# Patient Record
Sex: FEMALE | Race: WHITE | NOT HISPANIC OR LATINO
[De-identification: names, ages, dates, MRNs, and addresses within clinical notes are randomized per-mention and may not be internally consistent; named-entity substitution may affect disease eponyms.]

---

## 2017-01-17 ENCOUNTER — PRIOR ORIGINAL RECORDS (OUTPATIENT)
Dept: OTHER | Age: 42
End: 2017-01-17

## 2017-04-05 RX ORDER — FAMOTIDINE 40 MG/1
TABLET, FILM COATED ORAL
Qty: 30 TABLET | Refills: 0 | OUTPATIENT
Start: 2017-04-05

## 2017-07-25 ENCOUNTER — PRIOR ORIGINAL RECORDS (OUTPATIENT)
Dept: OTHER | Age: 42
End: 2017-07-25

## 2017-08-08 ENCOUNTER — PRIOR ORIGINAL RECORDS (OUTPATIENT)
Dept: OTHER | Age: 42
End: 2017-08-08

## 2017-09-25 ENCOUNTER — APPOINTMENT (OUTPATIENT)
Dept: CV DIAGNOSTICS | Facility: HOSPITAL | Age: 42
DRG: 149 | End: 2017-09-25
Attending: INTERNAL MEDICINE
Payer: COMMERCIAL

## 2017-09-25 ENCOUNTER — APPOINTMENT (OUTPATIENT)
Dept: GENERAL RADIOLOGY | Facility: HOSPITAL | Age: 42
DRG: 149 | End: 2017-09-25
Attending: INTERNAL MEDICINE
Payer: COMMERCIAL

## 2017-09-25 ENCOUNTER — APPOINTMENT (OUTPATIENT)
Dept: MRI IMAGING | Facility: HOSPITAL | Age: 42
DRG: 149 | End: 2017-09-25
Attending: NURSE PRACTITIONER
Payer: COMMERCIAL

## 2017-09-25 ENCOUNTER — HOSPITAL ENCOUNTER (INPATIENT)
Facility: HOSPITAL | Age: 42
LOS: 1 days | Discharge: HOME | DRG: 149 | End: 2017-09-26
Attending: INTERNAL MEDICINE | Admitting: INTERNAL MEDICINE
Payer: COMMERCIAL

## 2017-09-25 ENCOUNTER — APPOINTMENT (OUTPATIENT)
Dept: ULTRASOUND IMAGING | Facility: HOSPITAL | Age: 42
DRG: 149 | End: 2017-09-25
Attending: INTERNAL MEDICINE
Payer: COMMERCIAL

## 2017-09-25 ENCOUNTER — PRIOR ORIGINAL RECORDS (OUTPATIENT)
Dept: OTHER | Age: 42
End: 2017-09-25

## 2017-09-25 PROBLEM — R42 DIZZINESS: Status: ACTIVE | Noted: 2017-09-25

## 2017-09-25 LAB
ALBUMIN SERPL-MCNC: 2.2 G/DL (ref 3.5–4.8)
ALBUMIN SERPL-MCNC: 4.1 G/DL (ref 3.5–4.8)
ALP LIVER SERPL-CCNC: 35 U/L (ref 37–98)
ALP LIVER SERPL-CCNC: 68 U/L (ref 37–98)
ALT SERPL-CCNC: 22 U/L (ref 14–54)
ALT SERPL-CCNC: 43 U/L (ref 14–54)
APTT PPP: 26.9 SECONDS (ref 25–34)
AST SERPL-CCNC: 16 U/L (ref 15–41)
AST SERPL-CCNC: 24 U/L (ref 15–41)
BASOPHILS # BLD AUTO: 0.04 X10(3) UL (ref 0–0.1)
BASOPHILS # BLD AUTO: 0.04 X10(3) UL (ref 0–0.1)
BASOPHILS NFR BLD AUTO: 0.5 %
BASOPHILS NFR BLD AUTO: 0.5 %
BILIRUB SERPL-MCNC: 0.3 MG/DL (ref 0.1–2)
BILIRUB SERPL-MCNC: 0.4 MG/DL (ref 0.1–2)
BUN BLD-MCNC: 14 MG/DL (ref 8–20)
BUN BLD-MCNC: 8 MG/DL (ref 8–20)
CALCIUM BLD-MCNC: 5.4 MG/DL (ref 8.3–10.3)
CALCIUM BLD-MCNC: 9.2 MG/DL (ref 8.3–10.3)
CHLORIDE: 107 MMOL/L (ref 101–111)
CHLORIDE: 122 MMOL/L (ref 101–111)
CO2: 12 MMOL/L (ref 22–32)
CO2: 21 MMOL/L (ref 22–32)
CREAT BLD-MCNC: 0.31 MG/DL (ref 0.55–1.02)
CREAT BLD-MCNC: 0.91 MG/DL (ref 0.55–1.02)
EOSINOPHIL # BLD AUTO: 0.08 X10(3) UL (ref 0–0.3)
EOSINOPHIL # BLD AUTO: 0.09 X10(3) UL (ref 0–0.3)
EOSINOPHIL NFR BLD AUTO: 1 %
EOSINOPHIL NFR BLD AUTO: 1 %
ERYTHROCYTE [DISTWIDTH] IN BLOOD BY AUTOMATED COUNT: 12.6 % (ref 11.5–16)
ERYTHROCYTE [DISTWIDTH] IN BLOOD BY AUTOMATED COUNT: 12.6 % (ref 11.5–16)
FREE T4: 0.8 NG/DL (ref 0.9–1.8)
GLUCOSE BLD-MCNC: 108 MG/DL (ref 65–99)
GLUCOSE BLD-MCNC: 55 MG/DL (ref 70–99)
GLUCOSE BLD-MCNC: 87 MG/DL (ref 70–99)
HAV IGM SER QL: 1.4 MG/DL (ref 1.7–3)
HAV IGM SER QL: 2.2 MG/DL (ref 1.7–3)
HCT VFR BLD AUTO: 40.3 % (ref 34–50)
HCT VFR BLD AUTO: 42.8 % (ref 34–50)
HGB BLD-MCNC: 14.2 G/DL (ref 12–16)
HGB BLD-MCNC: 14.4 G/DL (ref 12–16)
IMMATURE GRANULOCYTE COUNT: 0.04 X10(3) UL (ref 0–1)
IMMATURE GRANULOCYTE COUNT: 0.04 X10(3) UL (ref 0–1)
IMMATURE GRANULOCYTE RATIO %: 0.5 %
IMMATURE GRANULOCYTE RATIO %: 0.5 %
INR BLD: 1.08 (ref 0.89–1.11)
LYMPHOCYTES # BLD AUTO: 1.52 X10(3) UL (ref 0.9–4)
LYMPHOCYTES # BLD AUTO: 1.63 X10(3) UL (ref 0.9–4)
LYMPHOCYTES NFR BLD AUTO: 18.3 %
LYMPHOCYTES NFR BLD AUTO: 18.7 %
M PROTEIN MFR SERPL ELPH: 4.4 G/DL (ref 6.1–8.3)
M PROTEIN MFR SERPL ELPH: 7.9 G/DL (ref 6.1–8.3)
MCH RBC QN AUTO: 30.1 PG (ref 27–33.2)
MCH RBC QN AUTO: 31.5 PG (ref 27–33.2)
MCHC RBC AUTO-ENTMCNC: 33.6 G/DL (ref 31–37)
MCHC RBC AUTO-ENTMCNC: 35.2 G/DL (ref 31–37)
MCV RBC AUTO: 89.4 FL (ref 81–100)
MCV RBC AUTO: 89.5 FL (ref 81–100)
MONOCYTES # BLD AUTO: 0.46 X10(3) UL (ref 0.1–0.6)
MONOCYTES # BLD AUTO: 0.54 X10(3) UL (ref 0.1–0.6)
MONOCYTES NFR BLD AUTO: 5.5 %
MONOCYTES NFR BLD AUTO: 6.2 %
NEUTROPHIL ABS PRELIM: 6.16 X10 (3) UL (ref 1.3–6.7)
NEUTROPHIL ABS PRELIM: 6.36 X10 (3) UL (ref 1.3–6.7)
NEUTROPHILS # BLD AUTO: 6.16 X10(3) UL (ref 1.3–6.7)
NEUTROPHILS # BLD AUTO: 6.36 X10(3) UL (ref 1.3–6.7)
NEUTROPHILS NFR BLD AUTO: 73.1 %
NEUTROPHILS NFR BLD AUTO: 74.2 %
PLATELET # BLD AUTO: 266 10(3)UL (ref 150–450)
PLATELET # BLD AUTO: 286 10(3)UL (ref 150–450)
POTASSIUM SERPL-SCNC: 3 MMOL/L (ref 3.6–5.1)
POTASSIUM SERPL-SCNC: 4 MMOL/L (ref 3.6–5.1)
PSA SERPL DL<=0.01 NG/ML-MCNC: 14 SECONDS (ref 12–14.3)
RBC # BLD AUTO: 4.51 X10(6)UL (ref 3.8–5.1)
RBC # BLD AUTO: 4.78 X10(6)UL (ref 3.8–5.1)
RED CELL DISTRIBUTION WIDTH-SD: 41.2 FL (ref 35.1–46.3)
RED CELL DISTRIBUTION WIDTH-SD: 41.3 FL (ref 35.1–46.3)
SED RATE-ML: 8 MM/HR (ref 0–25)
SODIUM SERPL-SCNC: 137 MMOL/L (ref 136–144)
SODIUM SERPL-SCNC: 146 MMOL/L (ref 136–144)
TSI SER-ACNC: 0.95 MIU/ML (ref 0.35–5.5)
WBC # BLD AUTO: 8.3 X10(3) UL (ref 4–13)
WBC # BLD AUTO: 8.7 X10(3) UL (ref 4–13)

## 2017-09-25 PROCEDURE — 93923 UPR/LXTR ART STDY 3+ LVLS: CPT | Performed by: INTERNAL MEDICINE

## 2017-09-25 PROCEDURE — 70551 MRI BRAIN STEM W/O DYE: CPT | Performed by: NURSE PRACTITIONER

## 2017-09-25 PROCEDURE — B33QZZZ MAGNETIC RESONANCE IMAGING (MRI) OF CERVICO-CEREBRAL ARCH: ICD-10-PCS | Performed by: RADIOLOGY

## 2017-09-25 PROCEDURE — 70547 MR ANGIOGRAPHY NECK W/O DYE: CPT | Performed by: NURSE PRACTITIONER

## 2017-09-25 PROCEDURE — 99255 IP/OBS CONSLTJ NEW/EST HI 80: CPT | Performed by: OTHER

## 2017-09-25 PROCEDURE — 93306 TTE W/DOPPLER COMPLETE: CPT | Performed by: INTERNAL MEDICINE

## 2017-09-25 PROCEDURE — B33RZZZ MAGNETIC RESONANCE IMAGING (MRI) OF INTRACRANIAL ARTERIES: ICD-10-PCS | Performed by: RADIOLOGY

## 2017-09-25 PROCEDURE — 95816 EEG AWAKE AND DROWSY: CPT | Performed by: OTHER

## 2017-09-25 PROCEDURE — 71010 XR CHEST AP PORTABLE  (CPT=71010): CPT | Performed by: INTERNAL MEDICINE

## 2017-09-25 PROCEDURE — 70544 MR ANGIOGRAPHY HEAD W/O DYE: CPT | Performed by: NURSE PRACTITIONER

## 2017-09-25 RX ORDER — LORAZEPAM 2 MG/ML
1 INJECTION INTRAMUSCULAR ONCE AS NEEDED
Status: ACTIVE | OUTPATIENT
Start: 2017-09-25 | End: 2017-09-25

## 2017-09-25 RX ORDER — ACETAMINOPHEN 500 MG
1000 TABLET ORAL EVERY 8 HOURS PRN
Status: DISCONTINUED | OUTPATIENT
Start: 2017-09-25 | End: 2017-09-26

## 2017-09-25 RX ORDER — NEBIVOLOL 10 MG/1
10 TABLET ORAL
Status: DISCONTINUED | OUTPATIENT
Start: 2017-09-25 | End: 2017-09-26

## 2017-09-25 RX ORDER — LORAZEPAM 2 MG/ML
1 INJECTION INTRAMUSCULAR ONCE
Status: COMPLETED | OUTPATIENT
Start: 2017-09-25 | End: 2017-09-25

## 2017-09-25 RX ORDER — NEBIVOLOL 10 MG/1
10 TABLET ORAL DAILY
COMMUNITY
End: 2017-09-26

## 2017-09-25 RX ORDER — VALSARTAN 320 MG/1
160 TABLET ORAL DAILY
Status: DISCONTINUED | OUTPATIENT
Start: 2017-09-25 | End: 2017-09-26

## 2017-09-25 NOTE — H&P
Huyen Richardson  : 10/02/1975  ACCOUNT:  [id number]  [phone]  PCP:    TODAY'S DATE: 2017  DICTATED BY:  Viky Kline M.D.]    CHIEF COMPLAINT: [Dizziness and Fast heart rate.]    HPI: [On 2017, Ellen Campbell, a 37year old female, presented wi Penicillins - CLASS    MEDICATIONS: Selected prescriptions see below    VITAL SIGNS: [B/P - 150/92 , Pulse - 84, Weight -  201, Height -   63 , BMI - 35.6 ]    CONS: comfortable. WEIGHT: BMI parameters reviewed and discussed.  HEAD/FACE: no trauma and normo and we will follow this very nice lady during her hospital stay.  ]    PRESCRIPTIONS:   26/45/59 *Bystolic             45UQ      1 TABLET DAILY. 07/05/17 *Diovan               160MG     1 TABLET ONCE DAILY.                        An

## 2017-09-25 NOTE — PROGRESS NOTES
[facility] Neurology Note    [facility] Patient Status:  Inpatient    10/2/1975 MRN [id number]   [facility] 8NE-A Attending Monty Franz MD   Hosp Day # 0 PCP Yesenia Patel MD     REASON FOR CONSULTATION:    Right thigh numbn Essential hypertension        PAST SURGICAL HISTORY:  Past Surgical History:  No date:     FAMILY HISTORY:  family history includes Cancer in her father and maternal grandmother; Diabetes in her father; Heart Disorder in her maternal grandfather a III: Visual fields full, Pupils equal and reactive, 3 mm brisk  CN III, IV, VI: EOMI, no nystagmus  CN V: Facial sensation normal    CN VII: Symmetric facial movement   CN VIII: Normal hearing   CN IX, XI: uvula and palate elevate symmetrically    CN XI: s

## 2017-09-25 NOTE — PLAN OF CARE
New results this afternoon show much better lab results. Vitals are stable and call light in reach. Patient is alert and oriented, tylenol given for headache. EEG in progress and MRI will be done later. Family at bedside interacting with patient.  IV placed

## 2017-09-26 ENCOUNTER — APPOINTMENT (OUTPATIENT)
Dept: GENERAL RADIOLOGY | Facility: HOSPITAL | Age: 42
DRG: 149 | End: 2017-09-26
Attending: NURSE PRACTITIONER
Payer: COMMERCIAL

## 2017-09-26 VITALS
SYSTOLIC BLOOD PRESSURE: 131 MMHG | TEMPERATURE: 98 F | WEIGHT: 200 LBS | HEIGHT: 62 IN | BODY MASS INDEX: 36.8 KG/M2 | OXYGEN SATURATION: 99 % | HEART RATE: 69 BPM | DIASTOLIC BLOOD PRESSURE: 77 MMHG | RESPIRATION RATE: 18 BRPM

## 2017-09-26 LAB
BASOPHILS # BLD AUTO: 0.04 X10(3) UL (ref 0–0.1)
BASOPHILS NFR BLD AUTO: 0.7 %
BUN BLD-MCNC: 13 MG/DL (ref 8–20)
CALCIUM BLD-MCNC: 8.7 MG/DL (ref 8.3–10.3)
CHLORIDE: 110 MMOL/L (ref 101–111)
CHOLEST SMN-MCNC: 208 MG/DL (ref ?–200)
CO2: 22 MMOL/L (ref 22–32)
CREAT BLD-MCNC: 0.77 MG/DL (ref 0.55–1.02)
EOSINOPHIL # BLD AUTO: 0.21 X10(3) UL (ref 0–0.3)
EOSINOPHIL NFR BLD AUTO: 3.5 %
ERYTHROCYTE [DISTWIDTH] IN BLOOD BY AUTOMATED COUNT: 12.5 % (ref 11.5–16)
GLUCOSE BLD-MCNC: 86 MG/DL (ref 70–99)
HAV IGM SER QL: 2.3 MG/DL (ref 1.7–3)
HCT VFR BLD AUTO: 41.5 % (ref 34–50)
HDLC SERPL-MCNC: 32 MG/DL (ref 45–?)
HDLC SERPL: 6.5 {RATIO} (ref ?–4.44)
HGB BLD-MCNC: 13.7 G/DL (ref 12–16)
IMMATURE GRANULOCYTE COUNT: 0.02 X10(3) UL (ref 0–1)
IMMATURE GRANULOCYTE RATIO %: 0.3 %
LDLC SERPL CALC-MCNC: 117 MG/DL (ref ?–130)
LDLC SERPL-MCNC: 59 MG/DL (ref 5–40)
LYMPHOCYTES # BLD AUTO: 1.92 X10(3) UL (ref 0.9–4)
LYMPHOCYTES NFR BLD AUTO: 31.6 %
MCH RBC QN AUTO: 30 PG (ref 27–33.2)
MCHC RBC AUTO-ENTMCNC: 33 G/DL (ref 31–37)
MCV RBC AUTO: 91 FL (ref 81–100)
MONOCYTES # BLD AUTO: 0.58 X10(3) UL (ref 0.1–0.6)
MONOCYTES NFR BLD AUTO: 9.5 %
NEUTROPHIL ABS PRELIM: 3.31 X10 (3) UL (ref 1.3–6.7)
NEUTROPHILS # BLD AUTO: 3.31 X10(3) UL (ref 1.3–6.7)
NEUTROPHILS NFR BLD AUTO: 54.4 %
NONHDLC SERPL-MCNC: 176 MG/DL (ref ?–130)
PLATELET # BLD AUTO: 242 10(3)UL (ref 150–450)
POTASSIUM SERPL-SCNC: 4.1 MMOL/L (ref 3.6–5.1)
RBC # BLD AUTO: 4.56 X10(6)UL (ref 3.8–5.1)
RED CELL DISTRIBUTION WIDTH-SD: 41.2 FL (ref 35.1–46.3)
SODIUM SERPL-SCNC: 139 MMOL/L (ref 136–144)
TRIGLYCERIDES: 297 MG/DL (ref ?–150)
WBC # BLD AUTO: 6.1 X10(3) UL (ref 4–13)

## 2017-09-26 PROCEDURE — 99233 SBSQ HOSP IP/OBS HIGH 50: CPT | Performed by: OTHER

## 2017-09-26 PROCEDURE — 71020 XR CHEST PA + LAT CHEST (CPT=71020): CPT | Performed by: NURSE PRACTITIONER

## 2017-09-26 NOTE — PLAN OF CARE
DISCHARGE PLANNING    • Discharge to home or other facility with appropriate resources Progressing        Patient/Family Goals    • Patient/Family Long Term Goal Progressing    • Patient/Family Short Term Goal Progressing        SAFETY ADULT - FALL    • Fr

## 2017-09-26 NOTE — PROGRESS NOTES
[id number] [facility] Neurology Progress Note     [facility]ane Patient Status:  Inpatient    10/2/1975 MRN [id number]   [facility] 8NE-A Attending Elena Roche MD   Hosp Day # 1 PCP Joe Summers MD         Subjective:   Lane is a(n) 09/26/2017   K 4.1 09/26/2017    09/26/2017   CO2 22.0 09/26/2017   GLU 86 09/26/2017   CA 8.7 09/26/2017   ALB 4.1 09/25/2017   ALKPHO 68 09/25/2017   BILT 0.4 09/25/2017   TP 7.9 09/25/2017   AST 16 09/25/2017   ALT 43 09/25/2017   PTT 26.9 09/25/2 for  comparison.     • Nebivolol HCl  10 mg Oral Daily Beta Blocker   • valsartan  160 mg Oral Daily       Patient Active Problem List:     Essential hypertension     Gastroesophageal reflux disease     Dizziness    Assessment/Plan:    Right thigh paresthes

## 2017-09-26 NOTE — PAYOR COMM NOTE
--------------  ADMISSION REVIEW     Payor: [facility]  Subscriber #:  [id number]  Authorization Number: [id number]    Admit date: 9/25/17  Admit time: 200       Admitting Physician: Kathia Tucker MD  Attending Physician:  Kathia Tucker MD  Primary Care P CONS: no fever, chills, or recent weight changes. EYES: denies significant visual changes. ENMT: denies difficulties with hearing, otherwise negative. CV: occasional palpitations. RESP: denies chronic cough, hemoptysis. GI: denies melena, hematochezia.  : 4. Hypertension, benign    PLAN:  [We will be admitting Fatoumata to the hospital for further workup and evaluation.  We will have neurology see her for evaluation of some numbness and paresthesias of her right upper lateral thigh as well as some symptoms of di HX: PT IS A 40 YO FEMALE WHO PRESENTS WITH COMPLAINTS OF DIZZINESS, PALIPITATIONS AND RIGHT THIGH NUMBNESS AND PARESTHESIA.    PMH: HTN  RX: FAMOTIDIN, VALSARTAN, LABETALOL, NEBIVOLOL     BACKGROUND ACTIVITY: Posterior rhythm was in the range of 8-10 Hz, re Vital Signs:  Blood pressure 135/77, pulse 62, temperature 98.2 °F (36.8 °C), temperature source Oral, resp.  rate 18, height 5' 2\" (1.575 m), weight 200 lb (90.7 kg), SpO2 100 %.     General: Well developed, well nourished   HEENT: Mucous membranes moist 1. No acute intracranial abnormality identified.     2. A few scattered punctate nonspecific foci of T2/FLAIR hyperintensity are noted in the cerebral white matter.  Possible etiologies for these findings would include migraine headaches, postinfectious/pos Her right thigh numbness is unchanged, appears to be in a L2-3 distribution however she denies any lumbar pain or recent injury ?  Need for lumbar imaging which could be done as outpt as patient would like to go home today       Will discuss above with Dr Aggie Mason

## 2017-09-26 NOTE — PROGRESS NOTES
[facility]  Cardiology Progress Note    Subjective:  No chest pain or shortness of breath. Fleeting episode of dizziness earlier today which self resolved. Has been ambulating around unit without difficulties.     Objective:  /77 (BP Location:

## 2017-09-26 NOTE — PROCEDURES
Date of Procedure: 9/25/2017    Procedure: EEG (ELECTROENCEPHALOGRAM)     DX: RIGHT THIGH NUMBNESS AND DIZZINESS  HX: PT IS A 42 YO FEMALE WHO PRESENTS WITH COMPLAINTS OF DIZZINESS, PALIPITATIONS AND RIGHT THIGH NUMBNESS AND PARESTHESIA.    PMH: HTN  RX: FA

## 2017-10-10 ENCOUNTER — MYAURORA ACCOUNT LINK (OUTPATIENT)
Dept: OTHER | Age: 42
End: 2017-10-10

## 2017-10-10 ENCOUNTER — PRIOR ORIGINAL RECORDS (OUTPATIENT)
Dept: OTHER | Age: 42
End: 2017-10-10

## 2018-01-15 ENCOUNTER — PRIOR ORIGINAL RECORDS (OUTPATIENT)
Dept: OTHER | Age: 43
End: 2018-01-15

## 2018-01-22 ENCOUNTER — HOSPITAL ENCOUNTER (OUTPATIENT)
Age: 43
Discharge: HOME | End: 2018-01-22
Attending: EMERGENCY MEDICINE
Payer: COMMERCIAL

## 2018-01-22 ENCOUNTER — PRIOR ORIGINAL RECORDS (OUTPATIENT)
Dept: OTHER | Age: 43
End: 2018-01-22

## 2018-01-22 VITALS
RESPIRATION RATE: 18 BRPM | TEMPERATURE: 98 F | SYSTOLIC BLOOD PRESSURE: 139 MMHG | DIASTOLIC BLOOD PRESSURE: 84 MMHG | HEART RATE: 80 BPM | OXYGEN SATURATION: 100 %

## 2018-01-22 DIAGNOSIS — R19.7 DIARRHEA, UNSPECIFIED TYPE: Primary | ICD-10-CM

## 2018-01-22 PROCEDURE — 99213 OFFICE O/P EST LOW 20 MIN: CPT

## 2018-01-22 PROCEDURE — 99214 OFFICE O/P EST MOD 30 MIN: CPT

## 2018-01-22 RX ORDER — NEBIVOLOL 2.5 MG/1
2.5 TABLET ORAL DAILY
COMMUNITY

## 2018-01-22 RX ORDER — SULFAMETHOXAZOLE AND TRIMETHOPRIM 800; 160 MG/1; MG/1
1 TABLET ORAL 2 TIMES DAILY
Qty: 14 TABLET | Refills: 0 | Status: SHIPPED | OUTPATIENT
Start: 2018-01-22 | End: 2018-01-29

## 2018-01-22 NOTE — ED NOTES
Unable to provide stool specimen at this time. RX and collection supplies with instructions given to patient to return specimen to the outpatient lab.

## 2018-01-22 NOTE — ED PROVIDER NOTES
[facility]    History   Patient presents with:  Nausea/Vomiting/Diarrhea (gastrointestinal)    Stated Complaint: DIARRHEA    HPI    The patient is a 28-year-old female with a past history of hypertension posterior pharyngeal erythema  Chest: Clear to auscultation, no tenderness  Cardiovascular: Regular rate and rhythm without murmur  Abdomen: Soft, nontender and nondistended  Neurologic: Patient is awake, alert and oriented ×3.   The patient's motor strengt

## 2018-01-26 ENCOUNTER — LAB ENCOUNTER (OUTPATIENT)
Dept: LAB | Age: 43
End: 2018-01-26
Attending: EMERGENCY MEDICINE
Payer: COMMERCIAL

## 2018-01-26 DIAGNOSIS — R19.7 DIARRHEA: Primary | ICD-10-CM

## 2018-01-26 LAB
ADENOVIRUS F 40/41 PCR: NEGATIVE
ASTROVIRUS PCR: NEGATIVE
C CAYETANENSIS DNA SPEC QL NAA+PROBE: NEGATIVE
C. DIFFICILE TOXIN A/B PCR: NEGATIVE
CAMPY SP DNA.DIARRHEA STL QL NAA+PROBE: NEGATIVE
CRYPTOSP DNA SPEC QL NAA+PROBE: POSITIVE
EAEC PAA PLAS AGGR+AATA ST NAA+NON-PRB: NEGATIVE
EC STX1+STX2 + H7 FLIC SPEC NAA+PROBE: NEGATIVE
ENTAMOEBA HISTOLYTICA PCR: NEGATIVE
EPEC EAE GENE STL QL NAA+NON-PROBE: NEGATIVE
ETEC LTA+ST1A+ST1B TOX ST NAA+NON-PROBE: NEGATIVE
GIARDIA LAMBLIA PCR: NEGATIVE
NOROVIRUS GI/GII PCR: NEGATIVE
P SHIGELLOIDES DNA STL QL NAA+PROBE: NEGATIVE
ROTAVIRUS A PCR: NEGATIVE
SALMONELLA DNA SPEC QL NAA+PROBE: NEGATIVE
SAPOVIRUS PCR: NEGATIVE
SHIGELLA SP+EIEC IPAH ST NAA+NON-PROBE: NEGATIVE
V CHOLERAE DNA SPEC QL NAA+PROBE: NEGATIVE
VIBRIO DNA SPEC NAA+PROBE: NEGATIVE
YERSINIA DNA SPEC NAA+PROBE: NEGATIVE

## 2018-01-26 PROCEDURE — 87507 IADNA-DNA/RNA PROBE TQ 12-25: CPT

## 2018-03-13 ENCOUNTER — PRIOR ORIGINAL RECORDS (OUTPATIENT)
Dept: OTHER | Age: 43
End: 2018-03-13

## 2018-03-14 ENCOUNTER — PRIOR ORIGINAL RECORDS (OUTPATIENT)
Dept: OTHER | Age: 43
End: 2018-03-14

## 2018-04-05 ENCOUNTER — HOSPITAL ENCOUNTER (OUTPATIENT)
Dept: CV DIAGNOSTICS | Facility: HOSPITAL | Age: 43
Discharge: HOME | End: 2018-04-05
Attending: INTERNAL MEDICINE
Payer: COMMERCIAL

## 2018-04-05 DIAGNOSIS — R94.31 NONSPECIFIC ABNORMAL ELECTROCARDIOGRAM (ECG) (EKG): ICD-10-CM

## 2018-04-05 DIAGNOSIS — I10 ESSENTIAL HYPERTENSION, MALIGNANT: ICD-10-CM

## 2018-04-05 DIAGNOSIS — R00.2 AWARENESS OF HEART BEAT: ICD-10-CM

## 2018-04-05 PROCEDURE — 93350 STRESS TTE ONLY: CPT | Performed by: INTERNAL MEDICINE

## 2018-04-05 PROCEDURE — 93017 CV STRESS TEST TRACING ONLY: CPT | Performed by: INTERNAL MEDICINE

## 2018-04-05 PROCEDURE — 93016 CV STRESS TEST SUPVJ ONLY: CPT | Performed by: INTERNAL MEDICINE

## 2018-04-05 PROCEDURE — 93018 CV STRESS TEST I&R ONLY: CPT | Performed by: INTERNAL MEDICINE

## 2018-04-06 ENCOUNTER — PRIOR ORIGINAL RECORDS (OUTPATIENT)
Dept: OTHER | Age: 43
End: 2018-04-06

## 2018-07-16 ENCOUNTER — PRIOR ORIGINAL RECORDS (OUTPATIENT)
Dept: OTHER | Age: 43
End: 2018-07-16

## 2018-07-30 ENCOUNTER — PRIOR ORIGINAL RECORDS (OUTPATIENT)
Dept: OTHER | Age: 43
End: 2018-07-30

## 2018-07-30 ENCOUNTER — MYAURORA ACCOUNT LINK (OUTPATIENT)
Dept: OTHER | Age: 43
End: 2018-07-30

## 2019-02-28 VITALS
WEIGHT: 199 LBS | HEIGHT: 63 IN | SYSTOLIC BLOOD PRESSURE: 130 MMHG | HEART RATE: 72 BPM | BODY MASS INDEX: 35.26 KG/M2 | DIASTOLIC BLOOD PRESSURE: 82 MMHG

## 2019-02-28 VITALS
HEIGHT: 63 IN | WEIGHT: 205 LBS | SYSTOLIC BLOOD PRESSURE: 128 MMHG | HEART RATE: 84 BPM | DIASTOLIC BLOOD PRESSURE: 82 MMHG | BODY MASS INDEX: 36.32 KG/M2

## 2019-02-28 VITALS
BODY MASS INDEX: 35.61 KG/M2 | WEIGHT: 201 LBS | DIASTOLIC BLOOD PRESSURE: 92 MMHG | SYSTOLIC BLOOD PRESSURE: 150 MMHG | HEIGHT: 63 IN | HEART RATE: 84 BPM

## 2019-02-28 VITALS
BODY MASS INDEX: 35.08 KG/M2 | HEART RATE: 76 BPM | SYSTOLIC BLOOD PRESSURE: 144 MMHG | WEIGHT: 198 LBS | HEIGHT: 63 IN | DIASTOLIC BLOOD PRESSURE: 90 MMHG

## 2019-02-28 VITALS
HEIGHT: 63 IN | BODY MASS INDEX: 35.08 KG/M2 | DIASTOLIC BLOOD PRESSURE: 84 MMHG | SYSTOLIC BLOOD PRESSURE: 122 MMHG | WEIGHT: 198 LBS | HEART RATE: 75 BPM

## 2019-03-01 VITALS
HEIGHT: 63 IN | BODY MASS INDEX: 34.02 KG/M2 | SYSTOLIC BLOOD PRESSURE: 155 MMHG | DIASTOLIC BLOOD PRESSURE: 96 MMHG | HEART RATE: 76 BPM | WEIGHT: 192 LBS

## 2019-04-15 RX ORDER — NEBIVOLOL HYDROCHLORIDE 2.5 MG/1
TABLET ORAL
Qty: 30 TABLET | Refills: 0 | Status: SHIPPED | OUTPATIENT
Start: 2019-04-15 | End: 2019-05-16 | Stop reason: SDUPTHER

## 2019-04-18 RX ORDER — IBUPROFEN 200 MG
TABLET ORAL
COMMUNITY

## 2019-04-18 RX ORDER — VALSARTAN 160 MG/1
TABLET ORAL
COMMUNITY
End: 2020-01-13 | Stop reason: SDUPTHER

## 2019-04-29 ENCOUNTER — APPOINTMENT (OUTPATIENT)
Dept: CARDIOLOGY | Age: 44
End: 2019-04-29

## 2019-05-17 RX ORDER — NEBIVOLOL HYDROCHLORIDE 2.5 MG/1
TABLET ORAL
Qty: 30 TABLET | Refills: 0 | Status: SHIPPED | OUTPATIENT
Start: 2019-05-17 | End: 2019-06-14 | Stop reason: SDUPTHER

## 2019-05-20 ENCOUNTER — OFFICE VISIT (OUTPATIENT)
Dept: CARDIOLOGY | Age: 44
End: 2019-05-20

## 2019-05-20 DIAGNOSIS — R00.0 TACHYCARDIA: ICD-10-CM

## 2019-05-20 DIAGNOSIS — I10 HYPERTENSION, BENIGN: Primary | ICD-10-CM

## 2019-05-20 DIAGNOSIS — R42 DIZZINESS: ICD-10-CM

## 2019-05-20 DIAGNOSIS — E78.01 ESSENTIAL FAMILIAL HYPERCHOLESTEROLEMIA: ICD-10-CM

## 2019-05-20 DIAGNOSIS — R00.2 PALPITATIONS: ICD-10-CM

## 2019-05-20 PROCEDURE — 99214 OFFICE O/P EST MOD 30 MIN: CPT | Performed by: INTERNAL MEDICINE

## 2019-05-20 PROCEDURE — 3075F SYST BP GE 130 - 139MM HG: CPT | Performed by: INTERNAL MEDICINE

## 2019-05-20 PROCEDURE — 3080F DIAST BP >= 90 MM HG: CPT | Performed by: INTERNAL MEDICINE

## 2019-05-20 RX ORDER — NEBIVOLOL 5 MG/1
5 TABLET ORAL DAILY
Qty: 30 TABLET | Refills: 1 | Status: SHIPPED | OUTPATIENT
Start: 2019-05-20 | End: 2019-12-01 | Stop reason: DRUGHIGH

## 2019-05-20 SDOH — HEALTH STABILITY: PHYSICAL HEALTH: ON AVERAGE, HOW MANY DAYS PER WEEK DO YOU ENGAGE IN MODERATE TO STRENUOUS EXERCISE (LIKE A BRISK WALK)?: 3 DAYS

## 2019-05-20 SDOH — HEALTH STABILITY: PHYSICAL HEALTH: ON AVERAGE, HOW MANY MINUTES DO YOU ENGAGE IN EXERCISE AT THIS LEVEL?: 30 MIN

## 2019-05-20 ASSESSMENT — PATIENT HEALTH QUESTIONNAIRE - PHQ9
SUM OF ALL RESPONSES TO PHQ9 QUESTIONS 1 AND 2: 0
2. FEELING DOWN, DEPRESSED OR HOPELESS: NOT AT ALL
1. LITTLE INTEREST OR PLEASURE IN DOING THINGS: NOT AT ALL
SUM OF ALL RESPONSES TO PHQ9 QUESTIONS 1 AND 2: 0

## 2019-05-20 ASSESSMENT — PAIN SCALES - GENERAL: PAINLEVEL: 0

## 2019-05-31 RX ORDER — FLUTICASONE PROPIONATE 50 MCG
2 SPRAY, SUSPENSION (ML) NASAL
COMMUNITY
Start: 2019-05-22 | End: 2019-06-21

## 2019-06-03 ENCOUNTER — OFFICE VISIT (OUTPATIENT)
Dept: CARDIOLOGY | Age: 44
End: 2019-06-03

## 2019-06-03 DIAGNOSIS — R00.2 PALPITATIONS: ICD-10-CM

## 2019-06-03 DIAGNOSIS — R42 DIZZINESS: ICD-10-CM

## 2019-06-03 DIAGNOSIS — R00.0 TACHYCARDIA: ICD-10-CM

## 2019-06-03 DIAGNOSIS — E78.01 ESSENTIAL FAMILIAL HYPERCHOLESTEROLEMIA: ICD-10-CM

## 2019-06-03 DIAGNOSIS — I10 HYPERTENSION, BENIGN: Primary | ICD-10-CM

## 2019-06-03 PROCEDURE — 99214 OFFICE O/P EST MOD 30 MIN: CPT | Performed by: INTERNAL MEDICINE

## 2019-06-03 PROCEDURE — 3080F DIAST BP >= 90 MM HG: CPT | Performed by: INTERNAL MEDICINE

## 2019-06-03 PROCEDURE — 3077F SYST BP >= 140 MM HG: CPT | Performed by: INTERNAL MEDICINE

## 2019-06-03 RX ORDER — NEBIVOLOL 10 MG/1
10 TABLET ORAL DAILY
Qty: 30 TABLET | Refills: 3 | Status: SHIPPED | OUTPATIENT
Start: 2019-06-03 | End: 2019-09-28 | Stop reason: SDUPTHER

## 2019-06-03 ASSESSMENT — PAIN SCALES - GENERAL: PAINLEVEL: 0

## 2019-06-14 RX ORDER — NEBIVOLOL HYDROCHLORIDE 2.5 MG/1
TABLET ORAL
Qty: 90 TABLET | Refills: 1 | Status: SHIPPED | OUTPATIENT
Start: 2019-06-14 | End: 2019-12-01 | Stop reason: DRUGHIGH

## 2019-10-01 RX ORDER — NEBIVOLOL HYDROCHLORIDE 10 MG/1
TABLET ORAL
Qty: 90 TABLET | Refills: 0 | Status: SHIPPED | OUTPATIENT
Start: 2019-10-01 | End: 2019-12-01 | Stop reason: ALTCHOICE

## 2019-12-09 ENCOUNTER — OFFICE VISIT (OUTPATIENT)
Dept: CARDIOLOGY | Age: 44
End: 2019-12-09

## 2019-12-09 VITALS
DIASTOLIC BLOOD PRESSURE: 86 MMHG | BODY MASS INDEX: 38.28 KG/M2 | SYSTOLIC BLOOD PRESSURE: 138 MMHG | HEIGHT: 62 IN | WEIGHT: 208 LBS | HEART RATE: 86 BPM

## 2019-12-09 DIAGNOSIS — R00.2 PALPITATIONS: ICD-10-CM

## 2019-12-09 DIAGNOSIS — R00.0 TACHYCARDIA: ICD-10-CM

## 2019-12-09 DIAGNOSIS — R42 DIZZINESS: ICD-10-CM

## 2019-12-09 DIAGNOSIS — E78.01 ESSENTIAL FAMILIAL HYPERCHOLESTEROLEMIA: ICD-10-CM

## 2019-12-09 DIAGNOSIS — I10 HYPERTENSION, BENIGN: Primary | ICD-10-CM

## 2019-12-09 PROCEDURE — 3079F DIAST BP 80-89 MM HG: CPT | Performed by: INTERNAL MEDICINE

## 2019-12-09 PROCEDURE — 3075F SYST BP GE 130 - 139MM HG: CPT | Performed by: INTERNAL MEDICINE

## 2019-12-09 PROCEDURE — 99214 OFFICE O/P EST MOD 30 MIN: CPT | Performed by: INTERNAL MEDICINE

## 2019-12-09 RX ORDER — METOPROLOL SUCCINATE 25 MG/1
25 TABLET, EXTENDED RELEASE ORAL DAILY
Qty: 30 TABLET | Refills: 3 | Status: SHIPPED | OUTPATIENT
Start: 2019-12-09 | End: 2020-04-06

## 2019-12-09 RX ORDER — OMEPRAZOLE 40 MG/1
40 CAPSULE, DELAYED RELEASE ORAL DAILY
Refills: 1 | COMMUNITY
Start: 2019-11-21 | End: 2020-06-22

## 2019-12-09 SDOH — HEALTH STABILITY: MENTAL HEALTH: HOW OFTEN DO YOU HAVE A DRINK CONTAINING ALCOHOL?: 2-4 TIMES A MONTH

## 2019-12-09 SDOH — HEALTH STABILITY: PHYSICAL HEALTH: ON AVERAGE, HOW MANY DAYS PER WEEK DO YOU ENGAGE IN MODERATE TO STRENUOUS EXERCISE (LIKE A BRISK WALK)?: 0 DAYS

## 2019-12-09 SDOH — HEALTH STABILITY: PHYSICAL HEALTH: ON AVERAGE, HOW MANY MINUTES DO YOU ENGAGE IN EXERCISE AT THIS LEVEL?: 0 MIN

## 2019-12-09 ASSESSMENT — PATIENT HEALTH QUESTIONNAIRE - PHQ9
SUM OF ALL RESPONSES TO PHQ9 QUESTIONS 1 AND 2: 0
2. FEELING DOWN, DEPRESSED OR HOPELESS: NOT AT ALL
SUM OF ALL RESPONSES TO PHQ9 QUESTIONS 1 AND 2: 0
1. LITTLE INTEREST OR PLEASURE IN DOING THINGS: NOT AT ALL

## 2019-12-12 RX ORDER — NEBIVOLOL HYDROCHLORIDE 10 MG/1
TABLET ORAL
Qty: 30 TABLET | Refills: 2 | OUTPATIENT
Start: 2019-12-12

## 2019-12-30 ENCOUNTER — APPOINTMENT (OUTPATIENT)
Dept: CARDIOLOGY | Age: 44
End: 2019-12-30

## 2020-01-13 RX ORDER — VALSARTAN 160 MG/1
TABLET ORAL
Qty: 45 TABLET | Refills: 3 | Status: SHIPPED | OUTPATIENT
Start: 2020-01-13 | End: 2020-01-17 | Stop reason: SDUPTHER

## 2020-01-17 ENCOUNTER — OFFICE VISIT (OUTPATIENT)
Dept: CARDIOLOGY | Age: 45
End: 2020-01-17

## 2020-01-17 VITALS
BODY MASS INDEX: 38.28 KG/M2 | SYSTOLIC BLOOD PRESSURE: 138 MMHG | HEART RATE: 88 BPM | DIASTOLIC BLOOD PRESSURE: 80 MMHG | WEIGHT: 208 LBS | HEIGHT: 62 IN

## 2020-01-17 DIAGNOSIS — R00.0 TACHYCARDIA: ICD-10-CM

## 2020-01-17 DIAGNOSIS — R00.2 PALPITATIONS: ICD-10-CM

## 2020-01-17 DIAGNOSIS — I10 HYPERTENSION, BENIGN: Primary | ICD-10-CM

## 2020-01-17 PROCEDURE — 3079F DIAST BP 80-89 MM HG: CPT | Performed by: NURSE PRACTITIONER

## 2020-01-17 PROCEDURE — 3075F SYST BP GE 130 - 139MM HG: CPT | Performed by: NURSE PRACTITIONER

## 2020-01-17 PROCEDURE — 99214 OFFICE O/P EST MOD 30 MIN: CPT | Performed by: NURSE PRACTITIONER

## 2020-01-17 RX ORDER — VALSARTAN 80 MG/1
TABLET ORAL
Qty: 90 TABLET | Refills: 3 | Status: SHIPPED | OUTPATIENT
Start: 2020-01-17 | End: 2020-03-18

## 2020-01-17 ASSESSMENT — ENCOUNTER SYMPTOMS
CHILLS: 0
HEMATOCHEZIA: 0
WEIGHT LOSS: 0
ALLERGIC/IMMUNOLOGIC COMMENTS: NO NEW FOOD ALLERGIES
HEMOPTYSIS: 0
WEIGHT GAIN: 0
SUSPICIOUS LESIONS: 0
FEVER: 0
BRUISES/BLEEDS EASILY: 0
COUGH: 0

## 2020-02-25 ENCOUNTER — HOSPITAL ENCOUNTER (OUTPATIENT)
Age: 45
Discharge: HOME | End: 2020-02-25
Attending: EMERGENCY MEDICINE
Payer: COMMERCIAL

## 2020-02-25 VITALS
BODY MASS INDEX: 37 KG/M2 | WEIGHT: 200 LBS | RESPIRATION RATE: 18 BRPM | OXYGEN SATURATION: 100 % | TEMPERATURE: 98 F | DIASTOLIC BLOOD PRESSURE: 110 MMHG | SYSTOLIC BLOOD PRESSURE: 180 MMHG | HEART RATE: 98 BPM

## 2020-02-25 DIAGNOSIS — H10.33 ACUTE CONJUNCTIVITIS OF BOTH EYES, UNSPECIFIED ACUTE CONJUNCTIVITIS TYPE: Primary | ICD-10-CM

## 2020-02-25 PROCEDURE — 99214 OFFICE O/P EST MOD 30 MIN: CPT

## 2020-02-25 PROCEDURE — 99213 OFFICE O/P EST LOW 20 MIN: CPT

## 2020-02-25 RX ORDER — METOPROLOL SUCCINATE 25 MG/1
25 TABLET, EXTENDED RELEASE ORAL
COMMUNITY
Start: 2019-12-09

## 2020-02-25 RX ORDER — OMEPRAZOLE 40 MG/1
CAPSULE, DELAYED RELEASE ORAL
COMMUNITY
Start: 2019-11-21

## 2020-02-25 RX ORDER — TOBRAMYCIN 3 MG/ML
1 SOLUTION/ DROPS OPHTHALMIC EVERY 4 HOURS
Qty: 1 BOTTLE | Refills: 0 | Status: SHIPPED | OUTPATIENT
Start: 2020-02-25

## 2020-02-25 NOTE — ED INITIAL ASSESSMENT (HPI)
C/o bilateral eye irritation with tearing, burning, and itching. Contact lenses removed by patient this morning. Symptoms worse in right eye.

## 2020-02-25 NOTE — ED PROVIDER NOTES
Patient Seen in: [address] [facility]    History   Patient presents with:  Eye Problem    Stated Complaint: RT EYE PAIN     HPI    Patient is here with complaint of starting today she has had some itching and discomfort to both of Physical Exam     ED Triage Vitals [02/25/20 1427]   BP (!) 182/112   Pulse 98   Resp 18   Temp 98 °F (36.7 °C)   Temp src Oral   SpO2 100 %   O2 Device None (Room air)       Current:BP (!) 180/110   Pulse 98   Temp 98 °F (36.7 °C) (Oral)   Res specified. Medications Prescribed:  Discharge Medication List as of 2/25/2020  2:46 PM    START taking these medications    Tobramycin Sulfate 0.3 % Ophthalmic Solution  Place 1 drop into both eyes every 4 (four) hours.  While awake, Normal, Disp-1 [facility]

## 2020-03-18 RX ORDER — VALSARTAN 80 MG/1
TABLET ORAL
Qty: 270 TABLET | Refills: 1 | Status: SHIPPED | OUTPATIENT
Start: 2020-03-18 | End: 2020-10-06

## 2020-04-06 RX ORDER — METOPROLOL SUCCINATE 25 MG/1
TABLET, EXTENDED RELEASE ORAL
Qty: 90 TABLET | Refills: 0 | Status: SHIPPED | OUTPATIENT
Start: 2020-04-06 | End: 2020-07-13

## 2020-06-22 ENCOUNTER — V-VISIT (OUTPATIENT)
Dept: CARDIOLOGY | Age: 45
End: 2020-06-22

## 2020-06-22 VITALS
WEIGHT: 200 LBS | SYSTOLIC BLOOD PRESSURE: 134 MMHG | HEIGHT: 62 IN | DIASTOLIC BLOOD PRESSURE: 86 MMHG | BODY MASS INDEX: 36.8 KG/M2

## 2020-06-22 DIAGNOSIS — R00.0 TACHYCARDIA: ICD-10-CM

## 2020-06-22 DIAGNOSIS — E78.01 ESSENTIAL FAMILIAL HYPERCHOLESTEROLEMIA: ICD-10-CM

## 2020-06-22 DIAGNOSIS — R42 DIZZINESS: ICD-10-CM

## 2020-06-22 DIAGNOSIS — I10 HYPERTENSION, BENIGN: Primary | ICD-10-CM

## 2020-06-22 DIAGNOSIS — R00.2 PALPITATIONS: ICD-10-CM

## 2020-06-22 PROCEDURE — 99214 OFFICE O/P EST MOD 30 MIN: CPT | Performed by: INTERNAL MEDICINE

## 2020-06-22 PROCEDURE — 3079F DIAST BP 80-89 MM HG: CPT | Performed by: INTERNAL MEDICINE

## 2020-06-22 PROCEDURE — 3075F SYST BP GE 130 - 139MM HG: CPT | Performed by: INTERNAL MEDICINE

## 2020-06-22 SDOH — HEALTH STABILITY: PHYSICAL HEALTH: ON AVERAGE, HOW MANY MINUTES DO YOU ENGAGE IN EXERCISE AT THIS LEVEL?: 30 MIN

## 2020-06-22 SDOH — HEALTH STABILITY: PHYSICAL HEALTH: ON AVERAGE, HOW MANY DAYS PER WEEK DO YOU ENGAGE IN MODERATE TO STRENUOUS EXERCISE (LIKE A BRISK WALK)?: 3 DAYS

## 2020-06-22 SDOH — HEALTH STABILITY: MENTAL HEALTH: HOW MANY STANDARD DRINKS CONTAINING ALCOHOL DO YOU HAVE ON A TYPICAL DAY?: 1 OR 2

## 2020-06-22 SDOH — HEALTH STABILITY: MENTAL HEALTH: HOW OFTEN DO YOU HAVE A DRINK CONTAINING ALCOHOL?: 2-4 TIMES A MONTH

## 2020-06-22 ASSESSMENT — ENCOUNTER SYMPTOMS
WEIGHT LOSS: 0
SUSPICIOUS LESIONS: 0
ALLERGIC/IMMUNOLOGIC COMMENTS: NO NEW FOOD ALLERGIES
CHILLS: 0
HEMATOCHEZIA: 0
BRUISES/BLEEDS EASILY: 0
FEVER: 0
WEIGHT GAIN: 0
COUGH: 0
HEMOPTYSIS: 0

## 2020-07-13 RX ORDER — METOPROLOL SUCCINATE 25 MG/1
TABLET, EXTENDED RELEASE ORAL
Qty: 30 TABLET | Refills: 0 | Status: SHIPPED | OUTPATIENT
Start: 2020-07-13 | End: 2020-08-12

## 2020-08-11 RX ORDER — METOPROLOL SUCCINATE 25 MG/1
TABLET, EXTENDED RELEASE ORAL
Qty: 30 TABLET | Refills: 0 | OUTPATIENT
Start: 2020-08-11

## 2020-08-12 RX ORDER — METOPROLOL SUCCINATE 25 MG/1
TABLET, EXTENDED RELEASE ORAL
Qty: 30 TABLET | Refills: 0 | Status: SHIPPED | OUTPATIENT
Start: 2020-08-12 | End: 2020-09-08

## 2020-09-08 RX ORDER — METOPROLOL SUCCINATE 25 MG/1
TABLET, EXTENDED RELEASE ORAL
Qty: 30 TABLET | Refills: 0 | Status: SHIPPED | OUTPATIENT
Start: 2020-09-08 | End: 2020-10-07

## 2020-10-06 RX ORDER — VALSARTAN 80 MG/1
TABLET ORAL
Qty: 270 TABLET | Refills: 0 | Status: SHIPPED | OUTPATIENT
Start: 2020-10-06 | End: 2021-01-06

## 2020-10-07 RX ORDER — METOPROLOL SUCCINATE 25 MG/1
TABLET, EXTENDED RELEASE ORAL
Qty: 90 TABLET | Refills: 0 | Status: SHIPPED | OUTPATIENT
Start: 2020-10-07 | End: 2021-01-06

## 2021-01-02 ENCOUNTER — TELEPHONE (OUTPATIENT)
Dept: CARDIOLOGY | Age: 46
End: 2021-01-02

## 2021-01-02 DIAGNOSIS — E78.01 ESSENTIAL FAMILIAL HYPERCHOLESTEROLEMIA: Primary | ICD-10-CM

## 2021-01-02 DIAGNOSIS — I10 HYPERTENSION, BENIGN: ICD-10-CM

## 2021-01-06 RX ORDER — METOPROLOL SUCCINATE 25 MG/1
TABLET, EXTENDED RELEASE ORAL
Qty: 30 TABLET | Refills: 0 | Status: SHIPPED | OUTPATIENT
Start: 2021-01-06 | End: 2021-02-09

## 2021-01-06 RX ORDER — VALSARTAN 80 MG/1
TABLET ORAL
Qty: 90 TABLET | Refills: 0 | Status: SHIPPED | OUTPATIENT
Start: 2021-01-06 | End: 2021-02-09

## 2021-02-06 ENCOUNTER — TELEPHONE (OUTPATIENT)
Dept: CARDIOLOGY | Age: 46
End: 2021-02-06

## 2021-02-09 ENCOUNTER — TELEPHONE (OUTPATIENT)
Dept: CARDIOLOGY | Age: 46
End: 2021-02-09

## 2021-02-09 RX ORDER — METOPROLOL SUCCINATE 25 MG/1
TABLET, EXTENDED RELEASE ORAL
Qty: 30 TABLET | Refills: 1 | Status: SHIPPED | OUTPATIENT
Start: 2021-02-09 | End: 2021-03-02 | Stop reason: SDUPTHER

## 2021-02-09 RX ORDER — VALSARTAN 80 MG/1
TABLET ORAL
Qty: 30 TABLET | Refills: 1 | Status: SHIPPED | OUTPATIENT
Start: 2021-02-09 | End: 2021-03-02 | Stop reason: SDUPTHER

## 2021-03-01 RX ORDER — OMEPRAZOLE 40 MG/1
40 CAPSULE, DELAYED RELEASE ORAL DAILY
COMMUNITY
Start: 2021-01-17

## 2021-03-01 RX ORDER — NEBIVOLOL 10 MG/1
TABLET ORAL
COMMUNITY
End: 2021-03-02

## 2021-03-02 ENCOUNTER — V-VISIT (OUTPATIENT)
Dept: CARDIOLOGY | Age: 46
End: 2021-03-02

## 2021-03-02 VITALS
DIASTOLIC BLOOD PRESSURE: 85 MMHG | HEIGHT: 62 IN | WEIGHT: 200 LBS | BODY MASS INDEX: 36.8 KG/M2 | SYSTOLIC BLOOD PRESSURE: 132 MMHG

## 2021-03-02 DIAGNOSIS — R00.2 PALPITATIONS: ICD-10-CM

## 2021-03-02 DIAGNOSIS — E78.01 ESSENTIAL FAMILIAL HYPERCHOLESTEROLEMIA: ICD-10-CM

## 2021-03-02 DIAGNOSIS — R00.0 TACHYCARDIA: ICD-10-CM

## 2021-03-02 DIAGNOSIS — I10 HYPERTENSION, BENIGN: ICD-10-CM

## 2021-03-02 DIAGNOSIS — R42 DIZZINESS: Primary | ICD-10-CM

## 2021-03-02 PROCEDURE — 99214 OFFICE O/P EST MOD 30 MIN: CPT | Performed by: INTERNAL MEDICINE

## 2021-03-02 PROCEDURE — 3075F SYST BP GE 130 - 139MM HG: CPT | Performed by: INTERNAL MEDICINE

## 2021-03-02 PROCEDURE — 3079F DIAST BP 80-89 MM HG: CPT | Performed by: INTERNAL MEDICINE

## 2021-03-02 RX ORDER — METOPROLOL SUCCINATE 25 MG/1
25 TABLET, EXTENDED RELEASE ORAL DAILY
Qty: 90 TABLET | Refills: 3 | Status: SHIPPED | OUTPATIENT
Start: 2021-03-02

## 2021-03-02 RX ORDER — VALSARTAN 80 MG/1
240 TABLET ORAL DAILY
Qty: 270 TABLET | Refills: 3 | Status: SHIPPED | OUTPATIENT
Start: 2021-03-02

## 2021-03-02 RX ORDER — VALSARTAN 80 MG/1
240 TABLET ORAL DAILY
Qty: 270 TABLET | Refills: 3 | Status: CANCELLED | OUTPATIENT
Start: 2021-03-02

## 2021-03-02 SDOH — HEALTH STABILITY: MENTAL HEALTH: HOW OFTEN DO YOU HAVE A DRINK CONTAINING ALCOHOL?: 2-4 TIMES A MONTH

## 2021-03-02 SDOH — HEALTH STABILITY: PHYSICAL HEALTH: ON AVERAGE, HOW MANY MINUTES DO YOU ENGAGE IN EXERCISE AT THIS LEVEL?: 40 MIN

## 2021-03-02 SDOH — HEALTH STABILITY: MENTAL HEALTH: HOW MANY STANDARD DRINKS CONTAINING ALCOHOL DO YOU HAVE ON A TYPICAL DAY?: 1 OR 2

## 2021-03-02 SDOH — HEALTH STABILITY: PHYSICAL HEALTH: ON AVERAGE, HOW MANY DAYS PER WEEK DO YOU ENGAGE IN MODERATE TO STRENUOUS EXERCISE (LIKE A BRISK WALK)?: 3 DAYS

## 2021-03-02 ASSESSMENT — PATIENT HEALTH QUESTIONNAIRE - PHQ9
CLINICAL INTERPRETATION OF PHQ2 SCORE: NO FURTHER SCREENING NEEDED
CLINICAL INTERPRETATION OF PHQ9 SCORE: NO FURTHER SCREENING NEEDED
SUM OF ALL RESPONSES TO PHQ9 QUESTIONS 1 AND 2: 0
CLINICAL INTERPRETATION OF PHQ2 SCORE: NO FURTHER SCREENING NEEDED
SUM OF ALL RESPONSES TO PHQ9 QUESTIONS 1 AND 2: 0
1. LITTLE INTEREST OR PLEASURE IN DOING THINGS: NOT AT ALL
2. FEELING DOWN, DEPRESSED OR HOPELESS: NOT AT ALL
SUM OF ALL RESPONSES TO PHQ9 QUESTIONS 1 AND 2: 0
2. FEELING DOWN, DEPRESSED OR HOPELESS: NOT AT ALL
1. LITTLE INTEREST OR PLEASURE IN DOING THINGS: NOT AT ALL

## 2021-03-02 ASSESSMENT — ENCOUNTER SYMPTOMS
COUGH: 0
FEVER: 0
CHILLS: 0
HEMOPTYSIS: 0
SUSPICIOUS LESIONS: 0
BRUISES/BLEEDS EASILY: 0
WEIGHT GAIN: 0
ALLERGIC/IMMUNOLOGIC COMMENTS: NO NEW FOOD ALLERGIES
HEMATOCHEZIA: 0
WEIGHT LOSS: 0

## 2021-05-25 VITALS
SYSTOLIC BLOOD PRESSURE: 130 MMHG | WEIGHT: 207 LBS | SYSTOLIC BLOOD PRESSURE: 146 MMHG | DIASTOLIC BLOOD PRESSURE: 92 MMHG | WEIGHT: 205 LBS | BODY MASS INDEX: 38.09 KG/M2 | HEART RATE: 78 BPM | BODY MASS INDEX: 37.73 KG/M2 | HEIGHT: 62 IN | DIASTOLIC BLOOD PRESSURE: 102 MMHG | HEART RATE: 88 BPM | HEIGHT: 62 IN

## 2022-08-03 ENCOUNTER — ORDER TRANSCRIPTION (OUTPATIENT)
Dept: ADMINISTRATIVE | Facility: HOSPITAL | Age: 47
End: 2022-08-03

## 2022-08-03 DIAGNOSIS — Z11.59 ENCOUNTER FOR SCREENING FOR OTHER VIRAL DISEASES: ICD-10-CM

## 2022-08-03 DIAGNOSIS — Z01.818 PREOP EXAMINATION: Primary | ICD-10-CM
